# Patient Record
Sex: FEMALE | Race: WHITE | NOT HISPANIC OR LATINO | Employment: OTHER | URBAN - METROPOLITAN AREA
[De-identification: names, ages, dates, MRNs, and addresses within clinical notes are randomized per-mention and may not be internally consistent; named-entity substitution may affect disease eponyms.]

---

## 2017-07-17 NOTE — PATIENT DISCUSSION
(H25.13) Age-related nuclear cataract, bilateral - Assesment : Examination revealed cataract. - Plan : Monitor for changes. Advised patient of condition of cataracts and discussed symptoms of worsening and becoming more bothersome. Updated GLRx given today. Pt to call our office with decreased vision or increased symptoms. RTC in 1 year for Exam, sooner if problems or changes.

## 2017-07-17 NOTE — PATIENT DISCUSSION
(A58.051) Keratoconjunct sicca, not specified as Sjogren's, bilateral - Assesment : Examination revealed Dry Eye Syndrome - Plan : Monitor for changes. ATs daily prn.

## 2018-02-06 NOTE — PATIENT DISCUSSION
(B30.9) Viral conjunctivitis, unspecified - Assesment : Examination revealed Conjunctivitis, Viral. Advised patient she is contagious, refrain from sharing towels, shaking hands ect. - Plan : Advised patient to call our office with decreased vision or increased symptoms.  Start FML  BID for 10 days then D/C Rtc as scheduled

## 2018-07-09 NOTE — PATIENT DISCUSSION
(H25.13) Age-related nuclear cataract, bilateral - Assesment : Examination revealed cataract. Mild symptoms. - Plan : Monitor for changes. Advised patient of condition of cataracts and discussed symptoms of cataracts worsening and becoming more bothersome. Pt to call if vision changes or becomes more bothered by visual symptoms before next appt. RTC in 1 year for Exam, sooner if problems or changes occur.

## 2019-07-15 NOTE — PATIENT DISCUSSION
(G13.600) Keratoconjunct sicca, not specified as Sjogren's, bilateral - Assesment : Examination revealed Dry Eye Syndrome - Plan : Monitor for changes. Explained symptoms of STERLING vs allergies. Recommended regular use of ATs 2-4 times per day and increase prn. Recommended OTC Zaditor OU bid prn if itching.

## 2019-07-15 NOTE — PATIENT DISCUSSION
(H25.13) Age-related nuclear cataract, bilateral - Assesment : Examination revealed cataract. Mild OU. - Plan : Monitor for changes. Updated GLRx given today. Advised patient of condition of cataracts. Explained symptoms of cataracts worsening and becoming more bothersome. Call if vision changes or becomes more bothered by visual symptoms before next appt. RTC in 1 year for Exam, sooner if problems or changes occur.

## 2020-06-08 NOTE — PATIENT DISCUSSION
Monitor for changes.  Call if vision changes or becomes more bothered by visual symptoms before next appt.

## 2020-06-08 NOTE — PATIENT DISCUSSION
Explained condition; viral vs allergy vs bacterial. Recommended Pt to follow contagious precautions.

## 2020-06-09 ENCOUNTER — NEW PATIENT COMPREHENSIVE (OUTPATIENT)
Dept: URBAN - METROPOLITAN AREA CLINIC 43 | Facility: CLINIC | Age: 70
End: 2020-06-09

## 2020-06-09 DIAGNOSIS — H43.813: ICD-10-CM

## 2020-06-09 DIAGNOSIS — H10.13: ICD-10-CM

## 2020-06-09 DIAGNOSIS — H52.7: ICD-10-CM

## 2020-06-09 DIAGNOSIS — H25.13: ICD-10-CM

## 2020-06-09 PROCEDURE — 92015 DETERMINE REFRACTIVE STATE: CPT

## 2020-06-09 PROCEDURE — 92004 COMPRE OPH EXAM NEW PT 1/>: CPT

## 2020-06-09 ASSESSMENT — VISUAL ACUITY
OD_CC: 20/30
OS_CC: 20/25-2
OD_CC: J1
OS_SC: 20/30+2
OU_SC: 20/30+2
OD_SC: 20/30
OS_CC: J3-2
OD_SC: J1
OS_SC: J10

## 2020-07-08 NOTE — PATIENT DISCUSSION
Advised Pt of condition of cataracts and option of CE to improve visual function vs updating GLRx. Pt prefers to wait at this time. Discussed symptoms of cataracts worsening and becoming more bothersome. Pt to call with vision changes or increased symptoms.

## 2021-03-11 ENCOUNTER — ESTABLISHED PATIENT (OUTPATIENT)
Dept: URBAN - METROPOLITAN AREA CLINIC 43 | Facility: CLINIC | Age: 71
End: 2021-03-11

## 2021-03-11 DIAGNOSIS — H53.2: ICD-10-CM

## 2021-03-11 DIAGNOSIS — H50.22: ICD-10-CM

## 2021-03-11 DIAGNOSIS — H10.13: ICD-10-CM

## 2021-03-11 DIAGNOSIS — G51.4: ICD-10-CM

## 2021-03-11 DIAGNOSIS — H50.21: ICD-10-CM

## 2021-03-11 DIAGNOSIS — H25.13: ICD-10-CM

## 2021-03-11 PROCEDURE — 92012 INTRM OPH EXAM EST PATIENT: CPT

## 2021-03-11 ASSESSMENT — VISUAL ACUITY
OS_SC: 20/25-3
OS_CC: 20/40+2
OS_CC: J4-
OD_CC: 20/40+2
OD_SC: J4
OS_SC: J10
OD_CC: J1-
OD_SC: 20/25-3

## 2021-06-15 ENCOUNTER — ESTABLISHED PATIENT (OUTPATIENT)
Dept: URBAN - METROPOLITAN AREA CLINIC 43 | Facility: CLINIC | Age: 71
End: 2021-06-15

## 2021-06-15 DIAGNOSIS — H52.7: ICD-10-CM

## 2021-06-15 DIAGNOSIS — H25.13: ICD-10-CM

## 2021-06-15 DIAGNOSIS — H02.401: ICD-10-CM

## 2021-06-15 DIAGNOSIS — H53.2: ICD-10-CM

## 2021-06-15 DIAGNOSIS — H35.62: ICD-10-CM

## 2021-06-15 PROCEDURE — 92250 FUNDUS PHOTOGRAPHY W/I&R: CPT

## 2021-06-15 PROCEDURE — 92015 DETERMINE REFRACTIVE STATE: CPT

## 2021-06-15 PROCEDURE — 92014 COMPRE OPH EXAM EST PT 1/>: CPT

## 2021-06-15 ASSESSMENT — TONOMETRY
OD_IOP_MMHG: 15
OS_IOP_MMHG: 14

## 2021-09-13 NOTE — PATIENT DISCUSSION
Monitor for changes. Advised Pt of condition of cataracts and option of CE to improve visual function once becomes more bothersome. Discussed symptoms of worsening and becoming more bothersome. Pt to call if vision changes or increased symptoms.

## 2021-12-14 ENCOUNTER — FOLLOW UP (OUTPATIENT)
Dept: URBAN - METROPOLITAN AREA CLINIC 47 | Facility: CLINIC | Age: 71
End: 2021-12-14

## 2021-12-14 DIAGNOSIS — H53.2: ICD-10-CM

## 2021-12-14 DIAGNOSIS — H02.401: ICD-10-CM

## 2021-12-14 DIAGNOSIS — H25.13: ICD-10-CM

## 2021-12-14 DIAGNOSIS — H50.21: ICD-10-CM

## 2021-12-14 DIAGNOSIS — H50.22: ICD-10-CM

## 2021-12-14 DIAGNOSIS — H10.13: ICD-10-CM

## 2021-12-14 PROCEDURE — 92012 INTRM OPH EXAM EST PATIENT: CPT

## 2021-12-14 RX ORDER — CYCLOSPORINE 0.5 MG/ML: 1 EMULSION OPHTHALMIC TWICE A DAY

## 2021-12-14 RX ORDER — OXYMETAZOLINE HYDROCHLORIDE OPHTHALMIC 1 MG/ML: 1 SOLUTION/ DROPS OPHTHALMIC ONCE A DAY

## 2021-12-14 ASSESSMENT — VISUAL ACUITY
OU_SC: 20/25
OS_SC: 20/40
OD_SC: 20/25-2

## 2021-12-14 ASSESSMENT — TONOMETRY
OD_IOP_MMHG: 13
OS_IOP_MMHG: 13

## 2022-06-14 ENCOUNTER — COMPREHENSIVE EXAM (OUTPATIENT)
Dept: URBAN - METROPOLITAN AREA CLINIC 47 | Facility: CLINIC | Age: 72
End: 2022-06-14

## 2022-06-14 DIAGNOSIS — H02.401: ICD-10-CM

## 2022-06-14 DIAGNOSIS — H53.2: ICD-10-CM

## 2022-06-14 DIAGNOSIS — H02.88B: ICD-10-CM

## 2022-06-14 DIAGNOSIS — H50.22: ICD-10-CM

## 2022-06-14 DIAGNOSIS — H25.13: ICD-10-CM

## 2022-06-14 DIAGNOSIS — H52.7: ICD-10-CM

## 2022-06-14 DIAGNOSIS — H25.813: ICD-10-CM

## 2022-06-14 DIAGNOSIS — H04.123: ICD-10-CM

## 2022-06-14 DIAGNOSIS — H02.88A: ICD-10-CM

## 2022-06-14 DIAGNOSIS — H50.21: ICD-10-CM

## 2022-06-14 PROCEDURE — 92015 DETERMINE REFRACTIVE STATE: CPT

## 2022-06-14 PROCEDURE — 92014 COMPRE OPH EXAM EST PT 1/>: CPT

## 2022-06-14 ASSESSMENT — VISUAL ACUITY
OD_CC: J1
OD_SC: J3
OD_SC: 20/20-1
OS_CC: J1-
OS_SC: 20/30+2
OS_SC: J10

## 2022-06-14 ASSESSMENT — TONOMETRY
OD_IOP_MMHG: 12
OS_IOP_MMHG: 12

## 2023-06-15 ENCOUNTER — COMPREHENSIVE EXAM (OUTPATIENT)
Dept: URBAN - METROPOLITAN AREA CLINIC 47 | Facility: CLINIC | Age: 73
End: 2023-06-15

## 2023-06-15 DIAGNOSIS — H02.88A: ICD-10-CM

## 2023-06-15 DIAGNOSIS — H02.88B: ICD-10-CM

## 2023-06-15 DIAGNOSIS — H10.13: ICD-10-CM

## 2023-06-15 DIAGNOSIS — H53.2: ICD-10-CM

## 2023-06-15 DIAGNOSIS — H25.13: ICD-10-CM

## 2023-06-15 DIAGNOSIS — H52.7: ICD-10-CM

## 2023-06-15 DIAGNOSIS — H50.21: ICD-10-CM

## 2023-06-15 DIAGNOSIS — H02.401: ICD-10-CM

## 2023-06-15 DIAGNOSIS — H25.813: ICD-10-CM

## 2023-06-15 DIAGNOSIS — H50.22: ICD-10-CM

## 2023-06-15 DIAGNOSIS — H04.123: ICD-10-CM

## 2023-06-15 PROCEDURE — 92015 DETERMINE REFRACTIVE STATE: CPT

## 2023-06-15 PROCEDURE — 92014 COMPRE OPH EXAM EST PT 1/>: CPT

## 2023-06-15 ASSESSMENT — TONOMETRY
OD_IOP_MMHG: 12
OS_IOP_MMHG: 12

## 2023-06-15 ASSESSMENT — VISUAL ACUITY
OS_CC: J8
OU_CC: J2
OD_CC: J1

## 2024-08-08 ENCOUNTER — COMPREHENSIVE EXAM (OUTPATIENT)
Dept: URBAN - METROPOLITAN AREA CLINIC 47 | Facility: CLINIC | Age: 74
End: 2024-08-08

## 2024-08-08 DIAGNOSIS — H04.123: ICD-10-CM

## 2024-08-08 DIAGNOSIS — H53.2: ICD-10-CM

## 2024-08-08 DIAGNOSIS — H50.22: ICD-10-CM

## 2024-08-08 DIAGNOSIS — H25.813: ICD-10-CM

## 2024-08-08 DIAGNOSIS — H02.88B: ICD-10-CM

## 2024-08-08 DIAGNOSIS — H52.7: ICD-10-CM

## 2024-08-08 DIAGNOSIS — H02.401: ICD-10-CM

## 2024-08-08 DIAGNOSIS — H50.21: ICD-10-CM

## 2024-08-08 DIAGNOSIS — H02.88A: ICD-10-CM

## 2024-08-08 PROCEDURE — 92015 DETERMINE REFRACTIVE STATE: CPT

## 2024-08-08 PROCEDURE — 92014 COMPRE OPH EXAM EST PT 1/>: CPT

## 2024-08-08 ASSESSMENT — VISUAL ACUITY
OS_CC: 20/25
OS_BAT: 20/40
OS_CC: J4
OD_CC: J1
OD_CC: 20/25-1
OD_BAT: 20/30

## 2024-08-08 ASSESSMENT — TONOMETRY
OS_IOP_MMHG: 15
OD_IOP_MMHG: 14

## 2025-08-11 ENCOUNTER — COMPREHENSIVE EXAM (OUTPATIENT)
Age: 75
End: 2025-08-11

## 2025-08-11 DIAGNOSIS — H53.032: ICD-10-CM

## 2025-08-11 DIAGNOSIS — H02.88B: ICD-10-CM

## 2025-08-11 DIAGNOSIS — H43.813: ICD-10-CM

## 2025-08-11 DIAGNOSIS — H04.123: ICD-10-CM

## 2025-08-11 DIAGNOSIS — H53.2: ICD-10-CM

## 2025-08-11 DIAGNOSIS — H25.813: ICD-10-CM

## 2025-08-11 DIAGNOSIS — H50.22: ICD-10-CM

## 2025-08-11 DIAGNOSIS — H50.21: ICD-10-CM

## 2025-08-11 DIAGNOSIS — H02.88A: ICD-10-CM

## 2025-08-11 PROCEDURE — 99214 OFFICE O/P EST MOD 30 MIN: CPT
